# Patient Record
Sex: FEMALE | Race: BLACK OR AFRICAN AMERICAN | NOT HISPANIC OR LATINO | URBAN - METROPOLITAN AREA
[De-identification: names, ages, dates, MRNs, and addresses within clinical notes are randomized per-mention and may not be internally consistent; named-entity substitution may affect disease eponyms.]

---

## 2022-04-21 ENCOUNTER — INPATIENT (INPATIENT)
Facility: HOSPITAL | Age: 62
LOS: 2 days | Discharge: HOME HEALTH | End: 2022-04-24
Attending: INTERNAL MEDICINE | Admitting: INTERNAL MEDICINE
Payer: MEDICARE

## 2022-04-21 VITALS
WEIGHT: 164.91 LBS | DIASTOLIC BLOOD PRESSURE: 64 MMHG | RESPIRATION RATE: 18 BRPM | HEIGHT: 64 IN | HEART RATE: 138 BPM | SYSTOLIC BLOOD PRESSURE: 98 MMHG | TEMPERATURE: 100 F | OXYGEN SATURATION: 97 %

## 2022-04-21 LAB
ALBUMIN SERPL ELPH-MCNC: 2.3 G/DL — LOW (ref 3.3–5)
ALP SERPL-CCNC: 87 U/L — SIGNIFICANT CHANGE UP (ref 40–120)
ALT FLD-CCNC: 11 U/L — LOW (ref 12–78)
ANION GAP SERPL CALC-SCNC: 8 MMOL/L — SIGNIFICANT CHANGE UP (ref 5–17)
APTT BLD: 34 SEC — SIGNIFICANT CHANGE UP (ref 27.5–35.5)
AST SERPL-CCNC: 19 U/L — SIGNIFICANT CHANGE UP (ref 15–37)
BASOPHILS # BLD AUTO: 0.08 K/UL — SIGNIFICANT CHANGE UP (ref 0–0.2)
BASOPHILS NFR BLD AUTO: 0.5 % — SIGNIFICANT CHANGE UP (ref 0–2)
BILIRUB SERPL-MCNC: 0.2 MG/DL — SIGNIFICANT CHANGE UP (ref 0.2–1.2)
BUN SERPL-MCNC: 10 MG/DL — SIGNIFICANT CHANGE UP (ref 7–23)
CALCIUM SERPL-MCNC: 9.6 MG/DL — SIGNIFICANT CHANGE UP (ref 8.5–10.1)
CHLORIDE SERPL-SCNC: 101 MMOL/L — SIGNIFICANT CHANGE UP (ref 96–108)
CO2 SERPL-SCNC: 23 MMOL/L — SIGNIFICANT CHANGE UP (ref 22–31)
CREAT SERPL-MCNC: 0.66 MG/DL — SIGNIFICANT CHANGE UP (ref 0.5–1.3)
EGFR: 98 ML/MIN/1.73M2 — SIGNIFICANT CHANGE UP
EOSINOPHIL # BLD AUTO: 0.68 K/UL — HIGH (ref 0–0.5)
EOSINOPHIL NFR BLD AUTO: 4.5 % — SIGNIFICANT CHANGE UP (ref 0–6)
FLUAV AG NPH QL: SIGNIFICANT CHANGE UP
FLUBV AG NPH QL: SIGNIFICANT CHANGE UP
GLUCOSE SERPL-MCNC: 109 MG/DL — HIGH (ref 70–99)
HCT VFR BLD CALC: 28.7 % — LOW (ref 34.5–45)
HGB BLD-MCNC: 8.7 G/DL — LOW (ref 11.5–15.5)
IMM GRANULOCYTES NFR BLD AUTO: 0.5 % — SIGNIFICANT CHANGE UP (ref 0–1.5)
INR BLD: 1.49 RATIO — HIGH (ref 0.88–1.16)
LACTATE SERPL-SCNC: 1 MMOL/L — SIGNIFICANT CHANGE UP (ref 0.7–2)
LACTATE SERPL-SCNC: 2.1 MMOL/L — HIGH (ref 0.7–2)
LYMPHOCYTES # BLD AUTO: 1.08 K/UL — SIGNIFICANT CHANGE UP (ref 1–3.3)
LYMPHOCYTES # BLD AUTO: 7.2 % — LOW (ref 13–44)
MCHC RBC-ENTMCNC: 24.2 PG — LOW (ref 27–34)
MCHC RBC-ENTMCNC: 30.3 G/DL — LOW (ref 32–36)
MCV RBC AUTO: 79.7 FL — LOW (ref 80–100)
MONOCYTES # BLD AUTO: 1.17 K/UL — HIGH (ref 0–0.9)
MONOCYTES NFR BLD AUTO: 7.8 % — SIGNIFICANT CHANGE UP (ref 2–14)
NEUTROPHILS # BLD AUTO: 11.86 K/UL — HIGH (ref 1.8–7.4)
NEUTROPHILS NFR BLD AUTO: 79.5 % — HIGH (ref 43–77)
NRBC # BLD: 0 /100 WBCS — SIGNIFICANT CHANGE UP (ref 0–0)
PLATELET # BLD AUTO: 611 K/UL — HIGH (ref 150–400)
POTASSIUM SERPL-MCNC: 4 MMOL/L — SIGNIFICANT CHANGE UP (ref 3.5–5.3)
POTASSIUM SERPL-SCNC: 4 MMOL/L — SIGNIFICANT CHANGE UP (ref 3.5–5.3)
PROT SERPL-MCNC: 8.1 GM/DL — SIGNIFICANT CHANGE UP (ref 6–8.3)
PROTHROM AB SERPL-ACNC: 18 SEC — HIGH (ref 10.5–13.4)
RBC # BLD: 3.6 M/UL — LOW (ref 3.8–5.2)
RBC # FLD: 17.5 % — HIGH (ref 10.3–14.5)
SARS-COV-2 RNA SPEC QL NAA+PROBE: SIGNIFICANT CHANGE UP
SODIUM SERPL-SCNC: 132 MMOL/L — LOW (ref 135–145)
TROPONIN I, HIGH SENSITIVITY RESULT: <3 NG/L — SIGNIFICANT CHANGE UP
WBC # BLD: 14.95 K/UL — HIGH (ref 3.8–10.5)
WBC # FLD AUTO: 14.95 K/UL — HIGH (ref 3.8–10.5)

## 2022-04-21 PROCEDURE — 99284 EMERGENCY DEPT VISIT MOD MDM: CPT | Mod: FS,CS

## 2022-04-21 PROCEDURE — 99223 1ST HOSP IP/OBS HIGH 75: CPT

## 2022-04-21 PROCEDURE — 93010 ELECTROCARDIOGRAM REPORT: CPT

## 2022-04-21 PROCEDURE — 71045 X-RAY EXAM CHEST 1 VIEW: CPT | Mod: 26

## 2022-04-21 PROCEDURE — 71275 CT ANGIOGRAPHY CHEST: CPT | Mod: 26,MA

## 2022-04-21 RX ORDER — HEPARIN SODIUM 5000 [USP'U]/ML
5000 INJECTION INTRAVENOUS; SUBCUTANEOUS EVERY 12 HOURS
Refills: 0 | Status: DISCONTINUED | OUTPATIENT
Start: 2022-04-21 | End: 2022-04-24

## 2022-04-21 RX ORDER — LANOLIN ALCOHOL/MO/W.PET/CERES
3 CREAM (GRAM) TOPICAL ONCE
Refills: 0 | Status: DISCONTINUED | OUTPATIENT
Start: 2022-04-21 | End: 2022-04-24

## 2022-04-21 RX ORDER — SODIUM CHLORIDE 9 MG/ML
1700 INJECTION INTRAMUSCULAR; INTRAVENOUS; SUBCUTANEOUS ONCE
Refills: 0 | Status: COMPLETED | OUTPATIENT
Start: 2022-04-21 | End: 2022-04-21

## 2022-04-21 RX ORDER — ONDANSETRON 8 MG/1
4 TABLET, FILM COATED ORAL ONCE
Refills: 0 | Status: COMPLETED | OUTPATIENT
Start: 2022-04-21 | End: 2022-04-21

## 2022-04-21 RX ORDER — MORPHINE SULFATE 50 MG/1
2 CAPSULE, EXTENDED RELEASE ORAL EVERY 4 HOURS
Refills: 0 | Status: DISCONTINUED | OUTPATIENT
Start: 2022-04-21 | End: 2022-04-24

## 2022-04-21 RX ORDER — SODIUM CHLORIDE 9 MG/ML
3 INJECTION INTRAMUSCULAR; INTRAVENOUS; SUBCUTANEOUS ONCE
Refills: 0 | Status: DISCONTINUED | OUTPATIENT
Start: 2022-04-21 | End: 2022-04-21

## 2022-04-21 RX ORDER — MORPHINE SULFATE 50 MG/1
4 CAPSULE, EXTENDED RELEASE ORAL ONCE
Refills: 0 | Status: DISCONTINUED | OUTPATIENT
Start: 2022-04-21 | End: 2022-04-21

## 2022-04-21 RX ORDER — SODIUM CHLORIDE 9 MG/ML
1000 INJECTION, SOLUTION INTRAVENOUS
Refills: 0 | Status: DISCONTINUED | OUTPATIENT
Start: 2022-04-21 | End: 2022-04-22

## 2022-04-21 RX ORDER — ACETAMINOPHEN 500 MG
650 TABLET ORAL EVERY 6 HOURS
Refills: 0 | Status: DISCONTINUED | OUTPATIENT
Start: 2022-04-21 | End: 2022-04-24

## 2022-04-21 RX ORDER — MORPHINE SULFATE 50 MG/1
2 CAPSULE, EXTENDED RELEASE ORAL ONCE
Refills: 0 | Status: DISCONTINUED | OUTPATIENT
Start: 2022-04-21 | End: 2022-04-21

## 2022-04-21 RX ORDER — METOPROLOL TARTRATE 50 MG
25 TABLET ORAL
Refills: 0 | Status: DISCONTINUED | OUTPATIENT
Start: 2022-04-21 | End: 2022-04-24

## 2022-04-21 RX ADMIN — SODIUM CHLORIDE 125 MILLILITER(S): 9 INJECTION, SOLUTION INTRAVENOUS at 21:24

## 2022-04-21 RX ADMIN — MORPHINE SULFATE 4 MILLIGRAM(S): 50 CAPSULE, EXTENDED RELEASE ORAL at 16:17

## 2022-04-21 RX ADMIN — Medication 25 MILLIGRAM(S): at 20:31

## 2022-04-21 RX ADMIN — HEPARIN SODIUM 5000 UNIT(S): 5000 INJECTION INTRAVENOUS; SUBCUTANEOUS at 22:36

## 2022-04-21 RX ADMIN — MORPHINE SULFATE 4 MILLIGRAM(S): 50 CAPSULE, EXTENDED RELEASE ORAL at 15:47

## 2022-04-21 RX ADMIN — Medication 650 MILLIGRAM(S): at 21:56

## 2022-04-21 RX ADMIN — MORPHINE SULFATE 2 MILLIGRAM(S): 50 CAPSULE, EXTENDED RELEASE ORAL at 18:19

## 2022-04-21 RX ADMIN — SODIUM CHLORIDE 1700 MILLILITER(S): 9 INJECTION INTRAMUSCULAR; INTRAVENOUS; SUBCUTANEOUS at 15:47

## 2022-04-21 RX ADMIN — ONDANSETRON 4 MILLIGRAM(S): 8 TABLET, FILM COATED ORAL at 17:43

## 2022-04-21 NOTE — ED PROVIDER NOTE - OBJECTIVE STATEMENT
63 yo F hx of stage iv Lung CA (on chemotherapy)  c/o chest pain and SOB she went to urgent care where she was transferred to the ED. admits to chronic cough. no remedy tried . vaccinated  for COVID.

## 2022-04-21 NOTE — H&P ADULT - HISTORY OF PRESENT ILLNESS
61 yo female PMH stage IV Lung CA (not on chemotherapy)  for past 6 months diagnosed with lung cancer 2 years ago now presents with chest pain and SOB. She went to urgent care where she was transferred to the ED. Admits to chronic cough, no effective remedy tried. Has been vaccinated  for COVID.  . 63 yo female PMH stage IV Lung CA (not on chemotherapy)  for past 6 months diagnosed with lung cancer 2 years ago now presents with chest pain and SOB. She went to urgent care where she was transferred to the ED. Admits to chronic cough, no effective remedy tried. Has been vaccinated  for COVID.  CTA shows large lung mass with likely extension. Has mediport in place.

## 2022-04-21 NOTE — ED PROVIDER NOTE - CLINICAL SUMMARY MEDICAL DECISION MAKING FREE TEXT BOX
61 yo F hx of stage iv Lung CA (on chemotherapy)  c/o chest pain and SOB she went to urgent care where she was transferred to the ED. admits to chronic cough. no remedy tried . vaccinated  for COVID.    exam as above  Tachy   r/o infectious/ pE /ACS etiology  labs    CT   abx  dw attending    likely admission

## 2022-04-21 NOTE — H&P ADULT - NSHPPHYSICALEXAM_GEN_ALL_CORE
PHYSICAL EXAMINATION:  Vital Signs Last 24 Hrs  T(C): 37.6 (21 Apr 2022 18:07), Max: 37.6 (21 Apr 2022 18:07)  T(F): 99.6 (21 Apr 2022 18:07), Max: 99.6 (21 Apr 2022 18:07)  HR: 132 (21 Apr 2022 19:50) (132 - 139)  BP: 110/62 (21 Apr 2022 19:50) (98/64 - 110/62)  BP(mean): --  RR: 19 (21 Apr 2022 19:50) (18 - 19)  SpO2: 98% (21 Apr 2022 19:50) (97% - 98%)  CAPILLARY BLOOD GLUCOSE          GENERAL: NAD, well-groomed, well-developed  HEAD:  atraumatic, normocephalic  EYES: sclera anicteric  ENMT: mucous membranes moist  NECK: supple, No JVD  CHEST/LUNG: clear to auscultation bilaterally; no rales, rhonchi, or wheezing b/l  HEART: normal S1, S2  ABDOMEN: BS+, soft, ND, NT   EXTREMITIES:  pulses palpable; no clubbing, cyanosis, or edema b/l LEs  NEURO: awake, alert, interactive; moves all extremities  SKIN: no rashes or lesions PHYSICAL EXAMINATION:  Vital Signs Last 24 Hrs  T(C): 37.6 (21 Apr 2022 18:07), Max: 37.6 (21 Apr 2022 18:07)  T(F): 99.6 (21 Apr 2022 18:07), Max: 99.6 (21 Apr 2022 18:07)  HR: 132 (21 Apr 2022 19:50) (132 - 139)  BP: 110/62 (21 Apr 2022 19:50) (98/64 - 110/62)  BP(mean): --  RR: 19 (21 Apr 2022 19:50) (18 - 19)  SpO2: 98% (21 Apr 2022 19:50) (97% - 98%)  CAPILLARY BLOOD GLUCOSE          GENERAL: seen in ER, some right sided rib pain, no fevers, no cough   ENMT: mucous membranes moist  NECK: supple, No JVD  CHEST/LUNG: clear to auscultation bilaterally; no rales, rhonchi, or wheezing b/l  HEART: normal S1, S2  ABDOMEN: BS+, soft, ND, NT   EXTREMITIES:  pulses palpable; no clubbing, cyanosis, or edema b/l LEs  NEURO: awake, alert, interactive; moves all extremities  SKIN: no rashes or lesions

## 2022-04-21 NOTE — H&P ADULT - NSHPLABSRESULTS_GEN_ALL_CORE
LABS:                        8.7    14.95 )-----------( 611      ( 21 Apr 2022 15:52 )             28.7     04-21    132<L>  |  101  |  10  ----------------------------<  109<H>  4.0   |  23  |  0.66    Ca    9.6      21 Apr 2022 16:38    TPro  8.1  /  Alb  2.3<L>  /  TBili  0.2  /  DBili  x   /  AST  19  /  ALT  11<L>  /  AlkPhos  87  04-21    PT/INR - ( 21 Apr 2022 16:38 )   PT: 18.0 sec;   INR: 1.49 ratio         PTT - ( 21 Apr 2022 16:38 )  PTT:34.0 sec        RADIOLOGY & ADDITIONAL TESTS:

## 2022-04-21 NOTE — ED ADULT TRIAGE NOTE - CHIEF COMPLAINT QUOTE
pt sent to ED by urgent care for dyspnea, dry cough x 3 days, hr 136 in office, pt c/o chest pain and sob. hx: lung CA

## 2022-04-21 NOTE — ED ADULT NURSE NOTE - OBJECTIVE STATEMENT
Pt presents to the ED complaining of headache/ dyspnea. Pt states she was having chest pain/sob x3days, went to  today and was sent to ED. Pt tachypneic/tachy in triage. Pt complaining of chills/dry cough, fever hx lung cx. Denies n/v/d, abd, constipation. Denies sick contact

## 2022-04-21 NOTE — H&P ADULT - ASSESSMENT
63 yo female PMH stage IV Lung CA (not on chemotherapy)  for past 6 months diagnosed with lung cancer 2 years ago now presents with chest pain and SOB. She went to urgent care where she was transferred to the ED. Admits to chronic cough, no effective remedy tried. Has been vaccinated  for COVID.  CTA shows large lung mass with likely extension.     Plan: Admit to medicine for stage IV lung cancer, possible pneumonia or progression of lung cancer. Will start empiric ABX as WBC count elevated. Lactate high on arrival   at 2.1 then 1.0 with IVF. Will continue IVF LR, add Lopressor 25 mg bid for HR and BP control. Added morphine sulphate prn pain.     Discussed GOC, not willing to sign DNR, will consider. Discussed plan with son at bedside.    61 yo female PMH stage IV Lung CA (not on chemotherapy)  for past 6 months diagnosed with lung cancer 2 years ago now presents with chest pain and SOB. She went to urgent care where she was transferred to the ED. Admits to chronic cough, no effective remedy tried. Has been vaccinated  for COVID.  CTA shows large lung mass with likely extension.     Plan: Admit to medicine for stage IV lung cancer, possible pneumonia or progression of lung cancer. Will start empiric ABX as WBC count elevated. Lactate high on arrival   at 2.1 then 1.0 with IVF. Will continue IVF LR, add Lopressor 25 mg bid for HR and BP control, took other BP med at home. Added morphine sulphate prn pain.     Discussed GOC, not willing to sign DNR at present, will consider. Discussed plan with son at bedside.    63 yo female PMH stage IV Lung CA (not on chemotherapy)  for past 6 months diagnosed with lung cancer 2 years ago now presents with chest pain and SOB. She went to urgent care where she was transferred to the ED. Admits to chronic cough, no effective remedy tried. Has been vaccinated  for COVID.  CTA shows large lung mass with likely extension.     Plan: Admit to medicine for stage IV lung cancer, possible pneumonia, more likely progression of lung cancer. Will start empiric ABX as WBC count elevated. Lactate high on arrival at 2.1 then 1.0 with IVF. Will continue IVF LR, add Lopressor 25 mg bid for HR and BP control, took other BP med at home. Added morphine sulphate prn pain.   Started IV Levaquin as has PCN allergy. RA sat 96 % on arrival.      Discussed GOC, not willing to sign DNR at present, will consider. Discussed plan with son at bedside.    61 yo female PMH stage IV Lung CA (not on chemotherapy)  for past 6 months diagnosed with lung cancer 2 years ago now presents with chest pain and SOB. She went to urgent care where she was transferred to the ED. Admits to chronic cough, no effective remedy tried. Has been vaccinated  for COVID.  CTA shows large lung mass with likely extension.     Plan: Admit to medicine for stage IV lung cancer, possible pneumonia, more likely progression of lung cancer. Will start empiric ABX as WBC count elevated. Lactate high on arrival at 2.1 then 1.0 with IVF. Will continue IVF LR, add Lopressor 25 mg bid for HR and BP control, took other BP med at home. Added morphine sulphate prn pain. Started IV Levaquin as has PCN allergy. RA sat 96 % on arrival.  Suspect all symptoms are from advanced lung cancer with growth into mediastinum.      Discussed GOC, not willing to sign DNR at present, will consider. Discussed plan with son at bedside.

## 2022-04-21 NOTE — ED PROVIDER NOTE - NS ED ATTENDING STATEMENT MOD
This was a shared visit with the SOY. I reviewed and verified the documentation and independently performed the documented:

## 2022-04-22 DIAGNOSIS — Z51.5 ENCOUNTER FOR PALLIATIVE CARE: ICD-10-CM

## 2022-04-22 DIAGNOSIS — R53.1 WEAKNESS: ICD-10-CM

## 2022-04-22 DIAGNOSIS — R10.9 UNSPECIFIED ABDOMINAL PAIN: ICD-10-CM

## 2022-04-22 DIAGNOSIS — R05.9 COUGH, UNSPECIFIED: ICD-10-CM

## 2022-04-22 DIAGNOSIS — R06.02 SHORTNESS OF BREATH: ICD-10-CM

## 2022-04-22 DIAGNOSIS — R53.83 OTHER FATIGUE: ICD-10-CM

## 2022-04-22 DIAGNOSIS — C34.90 MALIGNANT NEOPLASM OF UNSPECIFIED PART OF UNSPECIFIED BRONCHUS OR LUNG: ICD-10-CM

## 2022-04-22 LAB
ANION GAP SERPL CALC-SCNC: 9 MMOL/L — SIGNIFICANT CHANGE UP (ref 5–17)
APPEARANCE UR: CLEAR — SIGNIFICANT CHANGE UP
BILIRUB UR-MCNC: NEGATIVE — SIGNIFICANT CHANGE UP
BUN SERPL-MCNC: 5 MG/DL — LOW (ref 7–23)
CALCIUM SERPL-MCNC: 10.1 MG/DL — SIGNIFICANT CHANGE UP (ref 8.5–10.1)
CHLORIDE SERPL-SCNC: 102 MMOL/L — SIGNIFICANT CHANGE UP (ref 96–108)
CO2 SERPL-SCNC: 23 MMOL/L — SIGNIFICANT CHANGE UP (ref 22–31)
COLOR SPEC: YELLOW — SIGNIFICANT CHANGE UP
CREAT SERPL-MCNC: 0.56 MG/DL — SIGNIFICANT CHANGE UP (ref 0.5–1.3)
DIFF PNL FLD: NEGATIVE — SIGNIFICANT CHANGE UP
EGFR: 102 ML/MIN/1.73M2 — SIGNIFICANT CHANGE UP
GLUCOSE SERPL-MCNC: 87 MG/DL — SIGNIFICANT CHANGE UP (ref 70–99)
GLUCOSE UR QL: NEGATIVE MG/DL — SIGNIFICANT CHANGE UP
HCT VFR BLD CALC: 24.3 % — LOW (ref 34.5–45)
HCV AB S/CO SERPL IA: 0.13 S/CO — SIGNIFICANT CHANGE UP (ref 0–0.99)
HCV AB SERPL-IMP: SIGNIFICANT CHANGE UP
HGB BLD-MCNC: 7.3 G/DL — LOW (ref 11.5–15.5)
KETONES UR-MCNC: NEGATIVE — SIGNIFICANT CHANGE UP
LEUKOCYTE ESTERASE UR-ACNC: NEGATIVE — SIGNIFICANT CHANGE UP
MCHC RBC-ENTMCNC: 23.9 PG — LOW (ref 27–34)
MCHC RBC-ENTMCNC: 30 G/DL — LOW (ref 32–36)
MCV RBC AUTO: 79.4 FL — LOW (ref 80–100)
NITRITE UR-MCNC: NEGATIVE — SIGNIFICANT CHANGE UP
NRBC # BLD: 0 /100 WBCS — SIGNIFICANT CHANGE UP (ref 0–0)
PH UR: 7 — SIGNIFICANT CHANGE UP (ref 5–8)
PLATELET # BLD AUTO: 510 K/UL — HIGH (ref 150–400)
POTASSIUM SERPL-MCNC: 4.2 MMOL/L — SIGNIFICANT CHANGE UP (ref 3.5–5.3)
POTASSIUM SERPL-SCNC: 4.2 MMOL/L — SIGNIFICANT CHANGE UP (ref 3.5–5.3)
PROCALCITONIN SERPL-MCNC: 0.15 NG/ML — HIGH (ref 0.02–0.1)
PROT UR-MCNC: NEGATIVE MG/DL — SIGNIFICANT CHANGE UP
RBC # BLD: 3.06 M/UL — LOW (ref 3.8–5.2)
RBC # FLD: 17.2 % — HIGH (ref 10.3–14.5)
SODIUM SERPL-SCNC: 134 MMOL/L — LOW (ref 135–145)
SP GR SPEC: 1 — LOW (ref 1.01–1.02)
UROBILINOGEN FLD QL: NEGATIVE MG/DL — SIGNIFICANT CHANGE UP
WBC # BLD: 13.7 K/UL — HIGH (ref 3.8–10.5)
WBC # FLD AUTO: 13.7 K/UL — HIGH (ref 3.8–10.5)

## 2022-04-22 PROCEDURE — 99232 SBSQ HOSP IP/OBS MODERATE 35: CPT

## 2022-04-22 PROCEDURE — 99223 1ST HOSP IP/OBS HIGH 75: CPT

## 2022-04-22 RX ADMIN — MORPHINE SULFATE 2 MILLIGRAM(S): 50 CAPSULE, EXTENDED RELEASE ORAL at 05:43

## 2022-04-22 RX ADMIN — Medication 25 MILLIGRAM(S): at 17:18

## 2022-04-22 RX ADMIN — HEPARIN SODIUM 5000 UNIT(S): 5000 INJECTION INTRAVENOUS; SUBCUTANEOUS at 05:13

## 2022-04-22 RX ADMIN — Medication 650 MILLIGRAM(S): at 10:20

## 2022-04-22 RX ADMIN — HEPARIN SODIUM 5000 UNIT(S): 5000 INJECTION INTRAVENOUS; SUBCUTANEOUS at 17:18

## 2022-04-22 RX ADMIN — MORPHINE SULFATE 2 MILLIGRAM(S): 50 CAPSULE, EXTENDED RELEASE ORAL at 21:17

## 2022-04-22 RX ADMIN — Medication 650 MILLIGRAM(S): at 00:05

## 2022-04-22 RX ADMIN — Medication 650 MILLIGRAM(S): at 09:29

## 2022-04-22 RX ADMIN — MORPHINE SULFATE 2 MILLIGRAM(S): 50 CAPSULE, EXTENDED RELEASE ORAL at 05:13

## 2022-04-22 RX ADMIN — MORPHINE SULFATE 2 MILLIGRAM(S): 50 CAPSULE, EXTENDED RELEASE ORAL at 22:03

## 2022-04-22 RX ADMIN — SODIUM CHLORIDE 125 MILLILITER(S): 9 INJECTION, SOLUTION INTRAVENOUS at 07:56

## 2022-04-22 NOTE — CONSULT NOTE ADULT - PROBLEM SELECTOR RECOMMENDATION 5
could be cancer related fatigue  recommend optimizing pt's sleep-wake cycle with a schedule  pt takes Nyquil at home "every night"  recommend to continue melatonin while in hospital for sleep

## 2022-04-22 NOTE — CONSULT NOTE ADULT - ASSESSMENT
61 yo female PMH stage IV Lung CA (not on chemotherapy)  for past 6 months diagnosed with lung cancer 2 years ago now presents with chest pain and SOB. She went to urgent care where she was transferred to the ED. CTA shows large lung mass with likely extension. Palliative care consulted for GOC discussion in the setting of stage IV lung cancer. 63 yo female PMH stage IV Lung CA (not on chemotherapy)  for past 6 months diagnosed with lung cancer 2 years ago now presents with chest pain and SOB. She went to urgent care where she was transferred to the ED. CTA shows large lung mass with likely extension. Palliative care consulted for GOC discussion and symptom management in the setting of stage IV lung cancer.

## 2022-04-22 NOTE — CONSULT NOTE ADULT - SUBJECTIVE AND OBJECTIVE BOX
HPI:  61 yo female PMH stage IV Lung CA (not on chemotherapy)  for past 6 months diagnosed with lung cancer 2 years ago now presents with chest pain and SOB. She went to urgent care where she was transferred to the ED. Admits to chronic cough, no effective remedy tried. Has been vaccinated  for COVID.  CTA shows large lung mass with likely extension. Has mediport in place.  (2022 19:57)    PERTINENT PM/SXH:     FAMILY HISTORY:    ITEMS NOT CHECKED ARE NOT PRESENT    SOCIAL HISTORY:   Significant other/partner:  [x ]  Children:  [ ]  Denominational/Spirituality:  Substance hx:  [ ]   Tobacco hx:  [ ]   Alcohol hx: [ ]   Home Opioid hx:  [ ] I-Stop Reference No:[id number]  Living Situation: [ x]Home  [ ]Long term care  [ ]Rehab [ ]Other  with nat Alexander    ADVANCE DIRECTIVES:    DNR  MOLST  [ ]  Living Will  [ ]   DECISION MAKER(s):  [ ] Health Care Proxy(s)  [ ] Surrogate(s)  [ ] Guardian           Name(s): Phone Number(s):  nat Alexander: [phone]    BASELINE (I)ADL(s) (prior to admission):  [place]: [ ]Total  [x ] Moderate [ ]Dependent    Allergies    penicillin (Rash)    Intolerances    MEDICATIONS  (STANDING):  heparin   Injectable 5000 Unit(s) SubCutaneous every 12 hours  levoFLOXacin IVPB      levoFLOXacin IVPB 500 milliGRAM(s) IV Intermittent every 24 hours  metoprolol tartrate 25 milliGRAM(s) Oral two times a day    MEDICATIONS  (PRN):  acetaminophen     Tablet .. 650 milliGRAM(s) Oral every 6 hours PRN Temp greater or equal to 38C (100.4F), Mild Pain (1 - 3)  melatonin 3 milliGRAM(s) Oral once PRN Sleep  morphine  - Injectable 2 milliGRAM(s) IV Push every 4 hours PRN Mild Pain (1 - 3)    PRESENT SYMPTOMS: [ ]Unable to obtain due to poor mentation   Source if other than patient:  [ ]Family   [ ]Team     Pain (Impact on QOL):  denies  Location -         Minimal acceptable level (0-10 scale):                    Aggravating factors -  Quality -  Radiation -  Severity (0-10 scale) -    Timing -    PAIN AD Score:     [email or web address] (press ctrl +  left click to view)    Dyspnea:                           [ x]Mild [ ]Moderate [ ]Severe  Anxiety:                             [ ]Mild [ ]Moderate [ ]Severe  Fatigue:                             [ ]Mild [x ]Moderate [ ]Severe  Nausea:                             [ ]Mild [ ]Moderate [ ]Severe  Loss of appetite:              [ ]Mild [ ]Moderate [ ]Severe  Constipation:                    [ ]Mild [ ]Moderate [ ]Severe  Grief Present                    [ ] Yes   [x] No   Other Symptoms:  [x ]All other review of systems negative     Karnofsky Performance Score/Palliative Performance Status Version 2:    60-70     %    http://palliative.info/resource_material/[email or web address].pdf  PHYSICAL EXAM:  Vital Signs Last 24 Hrs  T(C): 36.8 (2022 10:24), Max: 38.1 (2022 21:16)  T(F): 98.2 (2022 10:24), Max: 100.5 (2022 21:16)  HR: 117 (2022 10:24) (109 - 139)  BP: 102/67 (2022 10:24) (95/61 - 114/72)  BP(mean): --  RR: 19 (2022 10:24) (18 - 20)  SpO2: 100% (2022 10:24) (97% - 100%) I&O's Summary    2022 07:  -  2022 07:00  --------------------------------------------------------  IN: 1250 mL / OUT: 0 mL / NET: 1250 mL    2022 07:01  -  2022 12:33  --------------------------------------------------------  IN: 120 mL / OUT: 0 mL / NET: 120 mL    GENERAL:  [x ]Alert  [x ]Oriented x3   [ ]Lethargic  [ ]Cachexia  [ ]Unarousable  [x ]Verbal  [ ]Non-Verbal  Behavioral:   [ ] Anxiety  [ ] Delirium [ ] Agitation [ ] Other  HEENT:  [x ]Normal   [ ]Dry mouth   [ ]ET Tube/Trach  [ ]Oral lesions  PULMONARY:   [x]Clear [ ]Tachypnea  [ ]Audible excessive secretions   [ ]Rhonchi        [ ]Right [ ]Left [ ]Bilateral  [ ]Crackles        [ ]Right [ ]Left [ ]Bilateral  [ ]Wheezing     [ ]Right [ ]Left [ ]Bilateral  CARDIOVASCULAR:    [ ]Regular [ ]Irregular [x ]Tachy  [ ]Boone [ ]Murmur [ ]Other  GASTROINTESTINAL:  [ x]Soft  [ ]Distended   [x ]+BS  [ x]Non tender [ ]Tender  [ ]PEG [ ]OGT/ NGT  Last BM: GENITOURINARY:   [x ]Normal [ ] Incontinent   [ ]Oliguria/Anuria   [ ]Mei  MUSCULOSKELETAL:   [ ]Normal   [x ]Weakness  [ ]Bed/Wheelchair bound [ ]Edema  NEUROLOGIC:   [ x]No focal deficits  [ ] Cognitive impairment  [ ] Dysphagia [ ]Dysarthria [ ] Paresis [ ]Other   SKIN:   [x ]Normal   [ ]Pressure ulcer(s)  [ ]Rash    CRITICAL CARE:  [ ] Shock Present  [ ]Septic [ ]Cardiogenic [ ]Neurologic [ ]Hypovolemic  [ ]  Vasopressors [ ]  Inotropes   [ ] Respiratory failure present  [ ] Acute  [ ] Chronic [ ] Hypoxic  [ ] Hypercarbic [ ] Other  [ ] Other organ failure     LABS:                        7.3    13.70 )-----------( 510      ( 2022 07:02 )             24.3   04-22    134<L>  |  102  |  5<L>  ----------------------------<  87  4.2   |  23  |  0.56    Ca    10.1      2022 07:02    TPro  8.1  /  Alb  2.3<L>  /  TBili  0.2  /  DBili  x   /  AST  19  /  ALT  11<L>  /  AlkPhos  87  04-21  PT/INR - ( 2022 16:38 )   PT: 18.0 sec;   INR: 1.49 ratio         PTT - ( 2022 16:38 )  PTT:34.0 sec    Urinalysis Basic - ( 2022 09:44 )    Color: Yellow / Appearance: Clear / S.005 / pH: x  Gluc: x / Ketone: Negative  / Bili: Negative / Urobili: Negative mg/dL   Blood: x / Protein: Negative mg/dL / Nitrite: Negative   Leuk Esterase: Negative / RBC: x / WBC x   Sq Epi: x / Non Sq Epi: x / Bacteria: x      RADIOLOGY & ADDITIONAL STUDIES:  < from: CT Angio Chest PE Protocol w/ IV Cont (22 @ 17:35) >    IMPRESSION:    No main, lobar or segmental pulmonary embolus. The subsegmental branches   of the pulmonary arteries are not evaluated secondary to motion.    The right lower lobe has been replaced by tumor   infiltration/consolidation. There is infiltration of the mass into the   right hilum and subcarinal region of the mediastinum.    < end of copied text >    < from: Xray Chest 1 View- PORTABLE-Urgent (22 @ 15:57) >    Impression:  There is a large right-sided mass measuring 14 x 8cm which may be better   evaluated with CT or compared to prior studies not presently available.  No specific evidence of pneumonia.  Infusion catheter in the right atrium without pneumothorax.    < end of copied text >      PROTEIN CALORIE MALNUTRITION PRESENT: [ ] Yes [ x] No  [ ] PPSV2 < or = to 30% [ ] significant weight loss  [ ] poor nutritional intake [ ] catabolic state [ ] anasarca     Artificial Nutrition [ ]     REFERRALS:   [ ]Chaplaincy  [ ] Hospice  [ ]Child Life  [ ]Social Work  [ ]Case management [ ]Holistic Therapy   Goals of Care Discussion Document:  HPI:  63 yo female PMH stage IV Lung CA (not on chemotherapy)  for past 6 months diagnosed with lung cancer 2 years ago now presents with chest pain and SOB. She went to urgent care where she was transferred to the ED. Admits to chronic cough, no effective remedy tried. Has been vaccinated  for COVID.  CTA shows large lung mass with likely extension. Has mediport in place.  (2022 19:57)    PERTINENT PM/SXH:     FAMILY HISTORY:    ITEMS NOT CHECKED ARE NOT PRESENT    SOCIAL HISTORY:   Significant other/partner:  [x ]  Children:  [ ]  Buddhism/Spirituality:  Substance hx:  [ ]   Tobacco hx:  [ ]   Alcohol hx: [ ]   Home Opioid hx:  [ ] I-Stop Reference No:[id number]  Living Situation: [ x]Home  [ ]Long term care  [ ]Rehab [ ]Other  with nat Alexander    ADVANCE DIRECTIVES:    DNR  MOLST  [ ]  Living Will  [ ]   DECISION MAKER(s):  [ ] Health Care Proxy(s)  [ ] Surrogate(s)  [ ] Guardian           Name(s): Phone Number(s):  nat Alexander: [phone]    BASELINE (I)ADL(s) (prior to admission):  [place]: [ ]Total  [x ] Moderate [ ]Dependent    Allergies    penicillin (Rash)    Intolerances    MEDICATIONS  (STANDING):  heparin   Injectable 5000 Unit(s) SubCutaneous every 12 hours  levoFLOXacin IVPB      levoFLOXacin IVPB 500 milliGRAM(s) IV Intermittent every 24 hours  metoprolol tartrate 25 milliGRAM(s) Oral two times a day    MEDICATIONS  (PRN):  acetaminophen     Tablet .. 650 milliGRAM(s) Oral every 6 hours PRN Temp greater or equal to 38C (100.4F), Mild Pain (1 - 3)  melatonin 3 milliGRAM(s) Oral once PRN Sleep  morphine  - Injectable 2 milliGRAM(s) IV Push every 4 hours PRN Mild Pain (1 - 3)    PRESENT SYMPTOMS: [ ]Unable to obtain due to poor mentation   Source if other than patient:  [ ]Family   [ ]Team     Pain (Impact on QOL):  denies  Location -         Minimal acceptable level (0-10 scale):                    Aggravating factors -  Quality -  Radiation -  Severity (0-10 scale) -    Timing -    PAIN AD Score:     [email or web address] (press ctrl +  left click to view)    Dyspnea:                           [ x]Mild [ ]Moderate [ ]Severe  Anxiety:                             [ ]Mild [ ]Moderate [ ]Severe  Fatigue:                             [ ]Mild [x ]Moderate [ ]Severe  Nausea:                             [ ]Mild [ ]Moderate [ ]Severe  Loss of appetite:              [ ]Mild [ ]Moderate [ ]Severe  Constipation:                    [ ]Mild [ ]Moderate [ ]Severe  Grief Present                    [ ] Yes   [x] No   Other Symptoms:  [x ]All other review of systems negative     Karnofsky Performance Score/Palliative Performance Status Version 2:    60-70     %    http://palliative.info/resource_material/PPSv2.pdf  PHYSICAL EXAM:  Vital Signs Last 24 Hrs  T(C): 36.8 (2022 10:24), Max: 38.1 (2022 21:16)  T(F): 98.2 (2022 10:24), Max: 100.5 (2022 21:16)  HR: 117 (2022 10:24) (109 - 139)  BP: 102/67 (2022 10:24) (95/61 - 114/72)  BP(mean): --  RR: 19 (2022 10:24) (18 - 20)  SpO2: 100% (2022 10:24) (97% - 100%) I&O's Summary    2022 07:  -  2022 07:00  --------------------------------------------------------  IN: 1250 mL / OUT: 0 mL / NET: 1250 mL    2022 07:01  -  2022 12:33  --------------------------------------------------------  IN: 120 mL / OUT: 0 mL / NET: 120 mL    GENERAL:  [x ]Alert  [x ]Oriented x3   [ ]Lethargic  [ ]Cachexia  [ ]Unarousable  [x ]Verbal  [ ]Non-Verbal  Behavioral:   [ ] Anxiety  [ ] Delirium [ ] Agitation [ ] Other  HEENT:  [x ]Normal   [ ]Dry mouth   [ ]ET Tube/Trach  [ ]Oral lesions  PULMONARY:   [x]Clear [ ]Tachypnea  [ ]Audible excessive secretions   [ ]Rhonchi        [ ]Right [ ]Left [ ]Bilateral  [ ]Crackles        [ ]Right [ ]Left [ ]Bilateral  [ ]Wheezing     [ ]Right [ ]Left [ ]Bilateral  CARDIOVASCULAR:    [ ]Regular [ ]Irregular [x ]Tachy  [ ]Boone [ ]Murmur [ ]Other  GASTROINTESTINAL:  [ x]Soft  [ ]Distended   [x ]+BS  [ x]Non tender [ ]Tender  [ ]PEG [ ]OGT/ NGT  Last BM:   GENITOURINARY:   [x ]Normal [ ] Incontinent   [ ]Oliguria/Anuria   [ ]Mei  MUSCULOSKELETAL:   [ ]Normal   [x ]Weakness  [ ]Bed/Wheelchair bound [ ]Edema  NEUROLOGIC:   [ x]No focal deficits  [ ] Cognitive impairment  [ ] Dysphagia [ ]Dysarthria [ ] Paresis [ ]Other   SKIN:   [x ]Normal   [ ]Pressure ulcer(s)  [ ]Rash    CRITICAL CARE:  [ ] Shock Present  [ ]Septic [ ]Cardiogenic [ ]Neurologic [ ]Hypovolemic  [ ]  Vasopressors [ ]  Inotropes   [ ] Respiratory failure present  [ ] Acute  [ ] Chronic [ ] Hypoxic  [ ] Hypercarbic [ ] Other  [ ] Other organ failure     LABS:                        7.3    13.70 )-----------( 510      ( 2022 07:02 )             24.3   04-22    134<L>  |  102  |  5<L>  ----------------------------<  87  4.2   |  23  |  0.56    Ca    10.1      2022 07:02    TPro  8.1  /  Alb  2.3<L>  /  TBili  0.2  /  DBili  x   /  AST  19  /  ALT  11<L>  /  AlkPhos  87  04-21  PT/INR - ( 2022 16:38 )   PT: 18.0 sec;   INR: 1.49 ratio         PTT - ( 2022 16:38 )  PTT:34.0 sec    Urinalysis Basic - ( 2022 09:44 )    Color: Yellow / Appearance: Clear / S.005 / pH: x  Gluc: x / Ketone: Negative  / Bili: Negative / Urobili: Negative mg/dL   Blood: x / Protein: Negative mg/dL / Nitrite: Negative   Leuk Esterase: Negative / RBC: x / WBC x   Sq Epi: x / Non Sq Epi: x / Bacteria: x      RADIOLOGY & ADDITIONAL STUDIES:  < from: CT Angio Chest PE Protocol w/ IV Cont (22 @ 17:35) >    IMPRESSION:    No main, lobar or segmental pulmonary embolus. The subsegmental branches   of the pulmonary arteries are not evaluated secondary to motion.    The right lower lobe has been replaced by tumor   infiltration/consolidation. There is infiltration of the mass into the   right hilum and subcarinal region of the mediastinum.    < end of copied text >    < from: Xray Chest 1 View- PORTABLE-Urgent (22 @ 15:57) >    Impression:  There is a large right-sided mass measuring 14 x 8cm which may be better   evaluated with CT or compared to prior studies not presently available.  No specific evidence of pneumonia.  Infusion catheter in the right atrium without pneumothorax.    < end of copied text >      PROTEIN CALORIE MALNUTRITION PRESENT: [ ] Yes [ x] No  [ ] PPSV2 < or = to 30% [ ] significant weight loss  [ ] poor nutritional intake [ ] catabolic state [ ] anasarca     Artificial Nutrition [ ]     REFERRALS:   [ ]Chaplaincy  [ ] Hospice  [ ]Child Life  [ ]Social Work  [ ]Case management [ ]Holistic Therapy   Goals of Care Discussion Document:

## 2022-04-22 NOTE — CONSULT NOTE ADULT - PROBLEM SELECTOR RECOMMENDATION 4
pt reports she used to walk the track with her son but has not recently d/t fatigue and wants to start back up  recommend PT edwar  pt in chair upon exam

## 2022-04-22 NOTE — CONSULT NOTE ADULT - PROBLEM SELECTOR RECOMMENDATION 6
pt reports she see's oncologist Dr. Rick Webb at [facility]- but has been on a break from chemo for 2 years but reported she needs to call him to see what options are available to her now. As per the pt's son at bedside Benjamin - the pt has been on and off of chemo - with frequent breaks but has been on a break now for about 2 years.  pt would be interested in learning of other options that can be given other then strictly just chemotherapy (ie immunotherapy)  recommend heme/onc input pt reports she see's oncologist Dr. Rick Webb at [facility]- but has been on a break from chemo for 2 years but reported she needs to call him to see what options are available to her now. As per the pt's son at bedside, Benjamin, - the pt has been on and off of chemo - with frequent breaks but has been on a break now for about 2 years.  pt would be interested in learning of other alternative options that can be given other than chemotherapy (ie immunotherapy)  recommend heme/onc input

## 2022-04-22 NOTE — CONSULT NOTE ADULT - PROBLEM SELECTOR RECOMMENDATION 3
pt admitted with right sided rib pain  reports no pain upon exam  pt received a total of 1 dose of morphine 2 mg IVP in the past 24 hours  recommend prn morphine as needed for pain

## 2022-04-22 NOTE — CONSULT NOTE ADULT - SUBJECTIVE AND OBJECTIVE BOX
Patient is a 62y old  Female who presents with a chief complaint of Right sided rib pain. (2022 12:44)    HPI:  61 yo female with HTN, Colon cancer S/P Surgery+ Colostomy followed by Chemo+ Radiation,  stage IV Lung CA diagnosed about two years ago  (not on chemotherapy)  for past 6 months . Non smoker.  Presented  with right sided chest pain, SOB, productive cough, Chills and subjective fever for few days.   Seen at  urgent care where she was transferred to the ED. Admits to chronic cough, no effective remedy tried.   Has been vaccinated  for COVID.  CTA shows large lung mass with likely extension. Has mediport in place.  (2022 19:57)    PAST MEDICAL & SURGICAL HISTORY:  as above    SOCIAL HISTORY: non smoker    Allergies  penicillin (Rash)    MEDICATIONS  (STANDING):  heparin   Injectable 5000 Unit(s) SubCutaneous every 12 hours  levoFLOXacin IVPB      levoFLOXacin IVPB 500 milliGRAM(s) IV Intermittent every 24 hours  metoprolol tartrate 25 milliGRAM(s) Oral two times a day    MEDICATIONS  (PRN):  acetaminophen     Tablet .. 650 milliGRAM(s) Oral every 6 hours PRN Temp greater or equal to 38C (100.4F), Mild Pain (1 - 3)  melatonin 3 milliGRAM(s) Oral once PRN Sleep  morphine  - Injectable 2 milliGRAM(s) IV Push every 4 hours PRN Mild Pain (1 - 3)    REVIEW OF SYSTEMS:    Constitutional:            No fever, weight loss or fatigue  HEENT:                      No difficulty hearing, tinnitus, vertigo; No sinus or throat pain  Respiratory:             SOB, Cough  Cardiovascular:          chest pain, palpitations  Gastrointestinal:        No abdominal or epigastric pain. No N/V/diarrhea or hematemesis  SKIN:                             no rash  Musculoskeletal:        No joint pain or swelling  Extremities:                No swelling  Neurological:              No headaches  Psychiatric:                 No depression, anxiety    Vital Signs Last 24 Hrs  T(C): 37.3 (2022 17:06), Max: 38.1 (2022 21:16)  T(F): 99.1 (2022 17:06), Max: 100.5 (2022 21:16)  HR: 120 (2022 17:06) (109 - 139)  BP: 128/77 (2022 17:06) (95/61 - 128/77)  BP(mean): --  RR: 18 (2022 17:06) (18 - 20)  SpO2: 97% (2022 17:06) (97% - 100%)    PHYSICAL EXAM:  GEN:         Awake, responsive and comfortable.  HEENT:    Normal.    RESP:        no wheezing  CVS:          Regular rate and rhythm.   ABD:         Soft, non-tender, non-distended;   SKIN:           Warm and dry.  EXTR:            No clubbing, cyanosis or edema  CNS:              Intact sensory and motor function.  PSYCH:        cooperative, no anxiety or depression    LABS:                        7.3    13.70 )-----------( 510      ( 2022 07:02 )             24.3     04-    134<L>  |  102  |  5<L>  ----------------------------<  87  4.2   |  23  |  0.56    Ca    10.1      2022 07:02    TPro  8.1  /  Alb  2.3<L>  /  TBili  0.2  /  DBili  x   /  AST  19  /  ALT  11<L>  /  AlkPhos  87  04-    PT/INR - ( 2022 16:38 )   PT: 18.0 sec;   INR: 1.49 ratio      PTT - ( 2022 16:38 )  PTT:34.0 sec    Urinalysis Basic - ( 2022 09:44 )    Color: Yellow / Appearance: Clear / S.005 / pH: x  Gluc: x / Ketone: Negative  / Bili: Negative / Urobili: Negative mg/dL   Blood: x / Protein: Negative mg/dL / Nitrite: Negative   Leuk Esterase: Negative / RBC: x / WBC x   Sq Epi: x / Non Sq Epi: x / Bacteria: x    EKG: sinus    RADIOLOGY & ADDITIONAL STUDIES:  < from: CT Angio Chest PE Protocol w/ IV Cont (22 @ 17:35) >  ACC: [id number] EXAM:  CT ANGIO CHEST PULM [facility]                          PROCEDURE DATE:  2022      INTERPRETATION:  INDICATION, CLINICAL INFORMATION: Chest pain, lung cancer  TECHNIQUE: CT pulmonary angiogram of the chest . Uneventful  administration of 90 cc Omnipaque 350. Coronal, sagittal and 3-D/MIP   images were reconstructed/reviewed.  COMPARISON: comparison chest CT.    FINDINGS:    PULMONARY VESSELS: No main, lobar or segmental pulmonary embolus. The   subsegmental branches of the pulmonary arteries are not evaluated   secondary to motion.    HEART AND VASCULATURE: Heart size is normal. No pericardial effusion. No   aortic aneurysm or dissection. Tip of the Mediport catheter at the   cavoatrial junction.    LUNGS, AIRWAYS, PLEURA: The segmental branches of right lower lobe   bronchus are obliterated. The remaining central airways are patent. The   right lower lobe has been replaced by tumor infiltration/consolidation.   There is infiltration of the mass into the right hilum and subcarinal   region. The other lobes are clear. No pleural effusions or pneumothorax.    MEDIASTINUM: Infiltration of the mass into the right hilum and subcarinal   region.    UPPER ABDOMEN: Within normal limits.    BONES AND SOFT TISSUES: No aggressive osseous lesion.    LOWER NECK: Within normal limits.    IMPRESSION:    No main, lobar or segmental pulmonary embolus. The subsegmental branches   of the pulmonary arteries are not evaluated secondary to motion.    The right lower lobe has been replaced by tumor   infiltration/consolidation. There is infiltration of the mass into the   right hilum and subcarinal region of the mediastinum.    COOKIE OLIVAREZ MD; Attending Radiologist  This document has been electronically signed. 2022  5:48PM    ASSESSMENT AND PLAN:  ·	SOB.  ·	Possibility of post obstructive pneumonia.  ·	Lung cancer.  ·	Leukocytosis.  ·	Anemia.   ·	Colon Cancer hx.  ·	HTN    SPo2 97% on room air.  Continue antibiotics.  Pain control.  Seen by palliative care.

## 2022-04-22 NOTE — CONSULT NOTE ADULT - PROBLEM SELECTOR RECOMMENDATION 9
pt on NC upon exam, reports improved SOB.   Remains SOB upon exertion  pt reports she does not use oxygen at home  oxygen prn, titrate off as tolerated pt on NC upon exam, reports improved SOB.   Remains SOB upon exertion  pt reports she does not use oxygen at home  oxygen prn, titrate off as tolerated  pt on IV abx for PNA tx

## 2022-04-23 LAB
ALBUMIN SERPL ELPH-MCNC: 2.3 G/DL — LOW (ref 3.3–5)
ALP SERPL-CCNC: 90 U/L — SIGNIFICANT CHANGE UP (ref 40–120)
ALT FLD-CCNC: 9 U/L — LOW (ref 12–78)
ANION GAP SERPL CALC-SCNC: 10 MMOL/L — SIGNIFICANT CHANGE UP (ref 5–17)
AST SERPL-CCNC: 20 U/L — SIGNIFICANT CHANGE UP (ref 15–37)
BILIRUB SERPL-MCNC: 0.3 MG/DL — SIGNIFICANT CHANGE UP (ref 0.2–1.2)
BUN SERPL-MCNC: 5 MG/DL — LOW (ref 7–23)
CALCIUM SERPL-MCNC: 10.6 MG/DL — HIGH (ref 8.5–10.1)
CHLORIDE SERPL-SCNC: 99 MMOL/L — SIGNIFICANT CHANGE UP (ref 96–108)
CO2 SERPL-SCNC: 25 MMOL/L — SIGNIFICANT CHANGE UP (ref 22–31)
CREAT SERPL-MCNC: 0.58 MG/DL — SIGNIFICANT CHANGE UP (ref 0.5–1.3)
EGFR: 101 ML/MIN/1.73M2 — SIGNIFICANT CHANGE UP
GLUCOSE SERPL-MCNC: 95 MG/DL — SIGNIFICANT CHANGE UP (ref 70–99)
HCT VFR BLD CALC: 25.5 % — LOW (ref 34.5–45)
HGB BLD-MCNC: 7.6 G/DL — LOW (ref 11.5–15.5)
MCHC RBC-ENTMCNC: 23.4 PG — LOW (ref 27–34)
MCHC RBC-ENTMCNC: 29.8 G/DL — LOW (ref 32–36)
MCV RBC AUTO: 78.5 FL — LOW (ref 80–100)
NRBC # BLD: 0 /100 WBCS — SIGNIFICANT CHANGE UP (ref 0–0)
PLATELET # BLD AUTO: 532 K/UL — HIGH (ref 150–400)
POTASSIUM SERPL-MCNC: 4.1 MMOL/L — SIGNIFICANT CHANGE UP (ref 3.5–5.3)
POTASSIUM SERPL-SCNC: 4.1 MMOL/L — SIGNIFICANT CHANGE UP (ref 3.5–5.3)
PROT SERPL-MCNC: 8.3 GM/DL — SIGNIFICANT CHANGE UP (ref 6–8.3)
RBC # BLD: 3.25 M/UL — LOW (ref 3.8–5.2)
RBC # FLD: 17.2 % — HIGH (ref 10.3–14.5)
SODIUM SERPL-SCNC: 134 MMOL/L — LOW (ref 135–145)
WBC # BLD: 14.83 K/UL — HIGH (ref 3.8–10.5)
WBC # FLD AUTO: 14.83 K/UL — HIGH (ref 3.8–10.5)

## 2022-04-23 PROCEDURE — 99233 SBSQ HOSP IP/OBS HIGH 50: CPT

## 2022-04-23 RX ORDER — METOPROLOL TARTRATE 50 MG
1 TABLET ORAL
Qty: 60 | Refills: 0
Start: 2022-04-23 | End: 2022-05-22

## 2022-04-23 RX ORDER — OXYCODONE HYDROCHLORIDE 5 MG/1
1 TABLET ORAL
Qty: 12 | Refills: 0
Start: 2022-04-23 | End: 2022-04-25

## 2022-04-23 RX ORDER — CIPROFLOXACIN LACTATE 400MG/40ML
1 VIAL (ML) INTRAVENOUS
Qty: 5 | Refills: 0
Start: 2022-04-23 | End: 2022-04-27

## 2022-04-23 RX ADMIN — HEPARIN SODIUM 5000 UNIT(S): 5000 INJECTION INTRAVENOUS; SUBCUTANEOUS at 05:27

## 2022-04-23 RX ADMIN — Medication 25 MILLIGRAM(S): at 17:14

## 2022-04-23 RX ADMIN — Medication 650 MILLIGRAM(S): at 16:43

## 2022-04-23 RX ADMIN — MORPHINE SULFATE 2 MILLIGRAM(S): 50 CAPSULE, EXTENDED RELEASE ORAL at 22:15

## 2022-04-23 RX ADMIN — HEPARIN SODIUM 5000 UNIT(S): 5000 INJECTION INTRAVENOUS; SUBCUTANEOUS at 17:14

## 2022-04-23 RX ADMIN — Medication 650 MILLIGRAM(S): at 17:26

## 2022-04-23 RX ADMIN — MORPHINE SULFATE 2 MILLIGRAM(S): 50 CAPSULE, EXTENDED RELEASE ORAL at 00:22

## 2022-04-23 RX ADMIN — Medication 25 MILLIGRAM(S): at 05:27

## 2022-04-23 RX ADMIN — MORPHINE SULFATE 2 MILLIGRAM(S): 50 CAPSULE, EXTENDED RELEASE ORAL at 01:00

## 2022-04-23 RX ADMIN — MORPHINE SULFATE 2 MILLIGRAM(S): 50 CAPSULE, EXTENDED RELEASE ORAL at 21:38

## 2022-04-23 NOTE — DIETITIAN INITIAL EVALUATION ADULT - OTHER INFO
PMH include stage IV lung cancer( not on chemo PTA), Lung cancer diagnosed 2 years ago.  Problem diagnosis include pneumonia, sepsis, anemia, for transfusion, for Palliative evaluation.   Pt educated on  consumption of 6 small meals, cold food items due to altered smell, trial of stewed prunes for c/o constipation.  Pt would like Ensure Plus 2 x day.

## 2022-04-23 NOTE — PHYSICAL THERAPY INITIAL EVALUATION ADULT - GENERAL OBSERVATIONS, REHAB EVAL
Chart (EMR) reviewed. Received sitting at bedside chair, NAD. +cardiac monitor donned, +heplock. Alert. Ox4. Able to follow multistep commands/directions.

## 2022-04-23 NOTE — DIETITIAN INITIAL EVALUATION ADULT - PERTINENT MEDS FT
MEDICATIONS  (STANDING):  heparin   Injectable 5000 Unit(s) SubCutaneous every 12 hours  levoFLOXacin IVPB      levoFLOXacin IVPB 500 milliGRAM(s) IV Intermittent every 24 hours  metoprolol tartrate 25 milliGRAM(s) Oral two times a day    MEDICATIONS  (PRN):  acetaminophen     Tablet .. 650 milliGRAM(s) Oral every 6 hours PRN Temp greater or equal to 38C (100.4F), Mild Pain (1 - 3)  melatonin 3 milliGRAM(s) Oral once PRN Sleep  morphine  - Injectable 2 milliGRAM(s) IV Push every 4 hours PRN Mild Pain (1 - 3)

## 2022-04-23 NOTE — DIETITIAN INITIAL EVALUATION ADULT - ORAL INTAKE PTA/DIET HISTORY
Pt appeared weak, c c/o lightheadedness, nausea, trying to eat small amounts, afraid to eat as she feels that she may vomit.  Pt c c/o constipation, altered smell c smell of hot foods bothering her due to nausea.  Pt's children did the shopping/cooking PTA.

## 2022-04-23 NOTE — DIETITIAN INITIAL EVALUATION ADULT - ETIOLOGY
inadequate protein-energy intake in setting of pneumonia, sepsis, altered GI function, weakness  inadequate protein-energy intake in setting of pneumonia, sepsis, altered GI function, anemia, weakness

## 2022-04-23 NOTE — DIETITIAN INITIAL EVALUATION ADULT - FACTORS AFF FOOD INTAKE
change in sense of smell or taste/persistent constipation/persistent lack of appetite/persistent nausea

## 2022-04-23 NOTE — DISCHARGE NOTE PROVIDER - NSDCMRMEDTOKEN_GEN_ALL_CORE_FT
benzonatate 200 mg oral capsule: 1 cap(s) orally 3 times a day, As Needed -for cough   levoFLOXacin 750 mg oral tablet: 1 tab(s) orally once a day   metoprolol tartrate 25 mg oral tablet: 1 tab(s) orally 2 times a day  Outpatient Physical Therapy :   Oxaydo 5 mg oral tablet: 1 tab(s) orally every 6 hours, As Needed -for moderate pain - for severe pain MDD:4tab

## 2022-04-23 NOTE — DISCHARGE NOTE PROVIDER - HOSPITAL COURSE
63 yo female PMH stage IV Lung CA/Colon Ca (not on chemotherapy)  for past 6 months diagnosed with lung cancer 2 years ago now presents with chest pain and SOB. She went to urgent care where she was transferred to the ED. Admits to chronic cough, no effective remedy tried. Has been vaccinated  for COVID.  CTA shows large lung mass with likely extension.       Dyspnea 2/2   stage IV lung cancer,  pneumonia, w sepsis poa   procal low, will change to po abx to f/u as outpt  pulm/palliative eval appreciated  pt doing well on room air  sinus tachycardia, asymptomatic can follow as outpatient     Anemia  likely anemia of chronic dz  stable for outpt monitoring         pt seen and examined 45 min spent on dc planning     Lab test review, Radiology Review, Vitals review, Consultant review and discussion, Physical examination, IDR, Assessment and plan; Plan discussion with patient and family    61 yo female PMH stage IV Lung CA/Colon Ca (not on chemotherapy)  for past 6 months diagnosed with lung cancer 2 years ago now presents with chest pain and SOB. She went to urgent care where she was transferred to the ED. Admits to chronic cough, no effective remedy tried. Has been vaccinated  for COVID.  CTA shows large lung mass with likely extension.       Dyspnea 2/2   stage IV lung cancer,  pneumonia, w sepsis poa   procal low, will change to po abx to f/u as outpt  pulm/palliative eval appreciated  pt doing well on room air  sinus tachycardia, asymptomatic can follow as outpatient, continue bb     Anemia  likely anemia of chronic dz  symptomatic, transfused 1un w good response  no active signs of bleeding         pt seen and examined 45 min spent on dc planning     Lab test review, Radiology Review, Vitals review, Consultant review and discussion, Physical examination, IDR, Assessment and plan; Plan discussion with patient and family

## 2022-04-23 NOTE — DIETITIAN INITIAL EVALUATION ADULT - PERTINENT LABORATORY DATA
04-23    134<L>  |  99  |  5<L>  ----------------------------<  95  4.1   |  25  |  0.58    Ca    10.6<H>      23 Apr 2022 08:46    TPro  8.3  /  Alb  2.3<L>  /  TBili  0.3  /  DBili  x   /  AST  20  /  ALT  9<L>  /  AlkPhos  90  04-23 04-23 Hgb 7.6 g/dL<L> Hct 25.5 %<L>, MCV, MCH, MCHV <L>

## 2022-04-23 NOTE — PROGRESS NOTE ADULT - ASSESSMENT
61 yo female PMH stage IV Lung CA/Colon Ca (not on chemotherapy)  for past 6 months diagnosed with lung cancer 2 years ago now presents with chest pain and SOB. She went to urgent care where she was transferred to the ED. Admits to chronic cough, no effective remedy tried. Has been vaccinated  for COVID.  CTA shows large lung mass with likely extension.       Dyspnea 2/2   stage IV lung cancer,  pneumonia, w sepsis poa   c.w iv abx    pulm/palliative eval     Anemia  likely anemia of chronic dz  will monitor cbc 
61 yo female PMH stage IV Lung CA/Colon Ca (not on chemotherapy)  for past 6 months diagnosed with lung cancer 2 years ago now presents with chest pain and SOB. She went to urgent care where she was transferred to the ED. Admits to chronic cough, no effective remedy tried. Has been vaccinated  for COVID.  CTA shows large lung mass with likely extension.       Pt sinus tachy, got lightheaded and nauseous w pt today, weak, hgb is 7.6 , likely symptomatic anemia  will transfuse 1un today     Dyspnea 2/2   stage IV lung cancer,  pneumonia, w sepsis poa , procal low   c.w iv abx    pulm/palliative eval     Anemia  likely anemia of chronic dz  will monitor cbc

## 2022-04-23 NOTE — DISCHARGE NOTE PROVIDER - NSDCCPCAREPLAN_GEN_ALL_CORE_FT
PRINCIPAL DISCHARGE DIAGNOSIS  Diagnosis: Chest pain  Assessment and Plan of Treatment: finish antibiotics for your pneumonia  follow up w your cancer doctor to continue treatment for lung cancer

## 2022-04-23 NOTE — PROGRESS NOTE ADULT - SUBJECTIVE AND OBJECTIVE BOX
INTERVAL HPI:  61 yo female with HTN, Colon cancer S/P Surgery+ Colostomy followed by Chemo+ Radiation,  stage IV Lung CA diagnosed about two years ago  (not on chemotherapy)  for past 6 months . Non smoker.  Presented  with right sided chest pain, SOB, productive cough, Chills and subjective fever for few days.   Seen at  urgent care where she was transferred to the ED. Admits to chronic cough, no effective remedy tried.   Has been vaccinated  for COVID.  CTA shows large lung mass with likely extension. Has mediport in place.  (2022 19:57)    OVERNIGHT EVENTS:  Feels better.    Vital Signs Last 24 Hrs  T(C): 37.4 (2022 17:20), Max: 37.8 (2022 15:42)  T(F): 99.3 (2022 17:20), Max: 100 (2022 15:42)  HR: 116 (2022 17:20) (116 - 123)  BP: 121/80 (2022 17:20) (108/61 - 128/72)  BP(mean): --  RR: 18 (2022 17:20) (18 - 18)  SpO2: 97% (2022 17:20) (96% - 98%)    PHYSICAL EXAM:  GEN:         Awake, responsive and comfortable.  HEENT:    Normal.    RESP:       no wheezing.  CVS:         Regular rate and rhythm.     MEDICATIONS  (STANDING):  heparin   Injectable 5000 Unit(s) SubCutaneous every 12 hours  levoFLOXacin IVPB      levoFLOXacin IVPB 500 milliGRAM(s) IV Intermittent every 24 hours  metoprolol tartrate 25 milliGRAM(s) Oral two times a day    MEDICATIONS  (PRN):  acetaminophen     Tablet .. 650 milliGRAM(s) Oral every 6 hours PRN Temp greater or equal to 38C (100.4F), Mild Pain (1 - 3)  melatonin 3 milliGRAM(s) Oral once PRN Sleep  morphine  - Injectable 2 milliGRAM(s) IV Push every 4 hours PRN Mild Pain (1 - 3)    LABS:                        7.6    14.83 )-----------( 532      ( 2022 08:46 )             25.5     04-23    134<L>  |  99  |  5<L>  ----------------------------<  95  4.1   |  25  |  0.58    Ca    10.6<H>      2022 08:46    TPro  8.3  /  Alb  2.3<L>  /  TBili  0.3  /  DBili  x   /  AST  20  /  ALT  9<L>  /  AlkPhos  90      Urinalysis Basic - ( 2022 09:44 )    Color: Yellow / Appearance: Clear / S.005 / pH: x  Gluc: x / Ketone: Negative  / Bili: Negative / Urobili: Negative mg/dL   Blood: x / Protein: Negative mg/dL / Nitrite: Negative   Leuk Esterase: Negative / RBC: x / WBC x   Sq Epi: x / Non Sq Epi: x / Bacteria: x    ASSESSMENT AND PLAN:  ·	SOB.  ·	Possibility of post obstructive pneumonia.  ·	Lung cancer.  ·	Leukocytosis.  ·	Anemia.   ·	Colon Cancer hx.  ·	HTN    SPO2 96% on room air.  Getting PRBC transfusion.  Continue antibiotics.  Pain control.
Patient is a 62y old  Female who presents with a chief complaint of Right sided rib pain. (2022 19:57)    INTERVAL HPI/OVERNIGHT EVENTS: no events     MEDICATIONS  (STANDING):  heparin   Injectable 5000 Unit(s) SubCutaneous every 12 hours  levoFLOXacin IVPB      levoFLOXacin IVPB 500 milliGRAM(s) IV Intermittent every 24 hours  metoprolol tartrate 25 milliGRAM(s) Oral two times a day    MEDICATIONS  (PRN):  acetaminophen     Tablet .. 650 milliGRAM(s) Oral every 6 hours PRN Temp greater or equal to 38C (100.4F), Mild Pain (1 - 3)  melatonin 3 milliGRAM(s) Oral once PRN Sleep  morphine  - Injectable 2 milliGRAM(s) IV Push every 4 hours PRN Mild Pain (1 - 3)    Allergies    penicillin (Rash)    Intolerances      REVIEW OF SYSTEMS:  All other systems reviewed and are negative    Vital Signs Last 24 Hrs  T(C): 36.8 (2022 10:24), Max: 38.1 (2022 21:16)  T(F): 98.2 (2022 10:24), Max: 100.5 (2022 21:16)  HR: 117 (2022 10:24) (109 - 139)  BP: 102/67 (2022 10:24) (95/61 - 114/72)  BP(mean): --  RR: 19 (2022 10:24) (18 - 20)  SpO2: 100% (2022 10:24) (97% - 100%)  Daily Height in cm: 162.56 (2022 14:08)    Daily Weight in k.1 (2022 21:16)  I&O's Summary    2022 07:01  -  2022 07:00  --------------------------------------------------------  IN: 1250 mL / OUT: 0 mL / NET: 1250 mL    2022 07:01  -  2022 12:44  --------------------------------------------------------  IN: 120 mL / OUT: 0 mL / NET: 120 mL      CAPILLARY BLOOD GLUCOSE        PHYSICAL EXAM:  GENERAL: NAD,    HEAD:  Atraumatic, Normocephalic  EYES: EOMI, PERRLA, conjunctiva and sclera clear  ENMT: No tonsillar erythema, exudates, or enlargement; Moist mucous membranes, Good dentition, No lesions  NECK: Supple, No JVD, Normal thyroid  NERVOUS SYSTEM:  Alert & Oriented X3, Good concentration; Motor Strength 5/5 B/L upper and lower extremities; DTRs 2+ intact and symmetric  CHEST/LUNG: Clear to percussion bilaterally; No rales, rhonchi, wheezing, or rubs  HEART: Regular rate and rhythm; No murmurs, rubs, or gallops  ABDOMEN: Soft, Nontender, Nondistended; Bowel sounds present  EXTREMITIES:  2+ Peripheral Pulses, No clubbing, cyanosis, or edema  LYMPH: No lymphadenopathy noted  SKIN: No rashes or lesions    Labs                          7.3    13.70 )-----------( 510      ( 2022 07:02 )             24.3     04-22    134<L>  |  102  |  5<L>  ----------------------------<  87  4.2   |  23  |  0.56    Ca    10.1      2022 07:02    TPro  8.1  /  Alb  2.3<L>  /  TBili  0.2  /  DBili  x   /  AST  19  /  ALT  11<L>  /  AlkPhos  87  04-21    PT/INR - ( 2022 16:38 )   PT: 18.0 sec;   INR: 1.49 ratio         PTT - ( 2022 16:38 )  PTT:34.0 sec      Urinalysis Basic - ( 2022 09:44 )    Color: Yellow / Appearance: Clear / S.005 / pH: x  Gluc: x / Ketone: Negative  / Bili: Negative / Urobili: Negative mg/dL   Blood: x / Protein: Negative mg/dL / Nitrite: Negative   Leuk Esterase: Negative / RBC: x / WBC x   Sq Epi: x / Non Sq Epi: x / Bacteria: x                  DVT prophylaxis: > Lovenox 40mg SQ daily  > Heparin   > SCD's
Patient is a 62y old  Female who presents with a chief complaint of Right sided rib pain. (2022 10:43)    INTERVAL HPI/OVERNIGHT EVENTS:    MEDICATIONS  (STANDING):  heparin   Injectable 5000 Unit(s) SubCutaneous every 12 hours  levoFLOXacin IVPB      levoFLOXacin IVPB 500 milliGRAM(s) IV Intermittent every 24 hours  metoprolol tartrate 25 milliGRAM(s) Oral two times a day    MEDICATIONS  (PRN):  acetaminophen     Tablet .. 650 milliGRAM(s) Oral every 6 hours PRN Temp greater or equal to 38C (100.4F), Mild Pain (1 - 3)  melatonin 3 milliGRAM(s) Oral once PRN Sleep  morphine  - Injectable 2 milliGRAM(s) IV Push every 4 hours PRN Mild Pain (1 - 3)    Allergies    penicillin (Rash)    Intolerances      REVIEW OF SYSTEMS:  All other systems reviewed and are negative    Vital Signs Last 24 Hrs  T(C): 37.1 (2022 10:48), Max: 37.3 (2022 17:06)  T(F): 98.8 (2022 10:48), Max: 99.1 (2022 17:06)  HR: 118 (2022 10:48) (117 - 123)  BP: 109/72 (2022 10:48) (106/75 - 128/77)  BP(mean): --  RR: 18 (2022 10:48) (18 - 20)  SpO2: 97% (2022 10:48) (96% - 98%)  Daily     Daily   I&O's Summary    2022 07:  -  2022 07:00  --------------------------------------------------------  IN: 360 mL / OUT: 0 mL / NET: 360 mL    2022 07:01  -  2022 11:29  --------------------------------------------------------  IN: 240 mL / OUT: 0 mL / NET: 240 mL      CAPILLARY BLOOD GLUCOSE        PHYSICAL EXAM:  GENERAL: NAD,    HEAD:  Atraumatic, Normocephalic  EYES: EOMI, PERRLA, conjunctiva and sclera clear  ENMT: No tonsillar erythema, exudates, or enlargement; Moist mucous membranes, Good dentition, No lesions  NECK: Supple, No JVD, Normal thyroid  NERVOUS SYSTEM:  Alert & Oriented X3, Good concentration; Motor Strength 5/5 B/L upper and lower extremities; DTRs 2+ intact and symmetric  CHEST/LUNG: Clear to percussion bilaterally; No rales, rhonchi, wheezing, or rubs  HEART: Regular rate and rhythm; No murmurs, rubs, or gallops  ABDOMEN: Soft, Nontender, Nondistended; Bowel sounds present  EXTREMITIES:  2+ Peripheral Pulses, No clubbing, cyanosis, or edema  LYMPH: No lymphadenopathy noted  SKIN: No rashes or lesions    Labs                          7.6    14.83 )-----------( 532      ( 2022 08:46 )             25.5     04-23    134<L>  |  99  |  5<L>  ----------------------------<  95  4.1   |  25  |  0.58    Ca    10.6<H>      2022 08:46    TPro  8.3  /  Alb  2.3<L>  /  TBili  0.3  /  DBili  x   /  AST  20  /  ALT  9<L>  /  AlkPhos  90  04-23    PT/INR - ( 2022 16:38 )   PT: 18.0 sec;   INR: 1.49 ratio         PTT - ( 2022 16:38 )  PTT:34.0 sec      Urinalysis Basic - ( 2022 09:44 )    Color: Yellow / Appearance: Clear / S.005 / pH: x  Gluc: x / Ketone: Negative  / Bili: Negative / Urobili: Negative mg/dL   Blood: x / Protein: Negative mg/dL / Nitrite: Negative   Leuk Esterase: Negative / RBC: x / WBC x   Sq Epi: x / Non Sq Epi: x / Bacteria: x        Culture - Blood (collected 2022 01:00)  Source: .Blood Blood-Peripheral  Preliminary Report (2022 02:01):    No growth to date.    Culture - Blood (collected 2022 01:00)  Source: .Blood Blood-Peripheral  Preliminary Report (2022 02:01):    No growth to date.                DVT prophylaxis: > Lovenox 40mg SQ daily  > Heparin   > SCD's

## 2022-04-23 NOTE — PHYSICAL THERAPY INITIAL EVALUATION ADULT - ADDITIONAL COMMENTS
Patient lives c children in a pvt house c 1 threshold entry step, 1 flight of stairs c L rail up inside.  Independent c all ADL's and ambulation without device. Pt has own straight cane.

## 2022-04-23 NOTE — DISCHARGE NOTE PROVIDER - DETAILS OF MALNUTRITION DIAGNOSIS/DIAGNOSES
This patient has been assessed with a concern for Malnutrition and was treated during this hospitalization for the following Nutrition diagnosis/diagnoses:     -  04/23/2022: Severe protein-calorie malnutrition

## 2022-04-24 VITALS
OXYGEN SATURATION: 97 % | SYSTOLIC BLOOD PRESSURE: 125 MMHG | DIASTOLIC BLOOD PRESSURE: 78 MMHG | RESPIRATION RATE: 18 BRPM | HEART RATE: 109 BPM | TEMPERATURE: 98 F

## 2022-04-24 LAB
CULTURE RESULTS: NO GROWTH — SIGNIFICANT CHANGE UP
HCT VFR BLD CALC: 29.1 % — LOW (ref 34.5–45)
HGB BLD-MCNC: 8.9 G/DL — LOW (ref 11.5–15.5)
MCHC RBC-ENTMCNC: 23.9 PG — LOW (ref 27–34)
MCHC RBC-ENTMCNC: 30.6 G/DL — LOW (ref 32–36)
MCV RBC AUTO: 78.2 FL — LOW (ref 80–100)
NRBC # BLD: 0 /100 WBCS — SIGNIFICANT CHANGE UP (ref 0–0)
PLATELET # BLD AUTO: 502 K/UL — HIGH (ref 150–400)
RBC # BLD: 3.72 M/UL — LOW (ref 3.8–5.2)
RBC # FLD: 16.5 % — HIGH (ref 10.3–14.5)
SPECIMEN SOURCE: SIGNIFICANT CHANGE UP
WBC # BLD: 13.5 K/UL — HIGH (ref 3.8–10.5)
WBC # FLD AUTO: 13.5 K/UL — HIGH (ref 3.8–10.5)

## 2022-04-24 PROCEDURE — 99239 HOSP IP/OBS DSCHRG MGMT >30: CPT

## 2022-04-24 RX ADMIN — Medication 25 MILLIGRAM(S): at 05:34

## 2022-04-24 RX ADMIN — HEPARIN SODIUM 5000 UNIT(S): 5000 INJECTION INTRAVENOUS; SUBCUTANEOUS at 05:34

## 2022-04-24 NOTE — DISCHARGE NOTE NURSING/CASE MANAGEMENT/SOCIAL WORK - PATIENT PORTAL LINK FT
You can access the FollowMyHealth Patient Portal offered by [facility] by registering at the following website: [email or web address]. By joining [employer]’s FollowMyHealth portal, you will also be able to view your health information using other applications (apps) compatible with our system.

## 2022-04-24 NOTE — DISCHARGE NOTE NURSING/CASE MANAGEMENT/SOCIAL WORK - NSDCPEFALRISK_GEN_ALL_CORE
For information on Fall & Injury Prevention, visit: [email or web address] OR  [email or web address] OR  https://www.cdc.gov/steadi/patient.html

## 2022-04-27 LAB
CULTURE RESULTS: SIGNIFICANT CHANGE UP
CULTURE RESULTS: SIGNIFICANT CHANGE UP
SPECIMEN SOURCE: SIGNIFICANT CHANGE UP
SPECIMEN SOURCE: SIGNIFICANT CHANGE UP

## 2022-05-03 DIAGNOSIS — I10 ESSENTIAL (PRIMARY) HYPERTENSION: ICD-10-CM

## 2022-05-03 DIAGNOSIS — A41.9 SEPSIS, UNSPECIFIED ORGANISM: ICD-10-CM

## 2022-05-03 DIAGNOSIS — D72.829 ELEVATED WHITE BLOOD CELL COUNT, UNSPECIFIED: ICD-10-CM

## 2022-05-03 DIAGNOSIS — C18.9 MALIGNANT NEOPLASM OF COLON, UNSPECIFIED: ICD-10-CM

## 2022-05-03 DIAGNOSIS — Z92.3 PERSONAL HISTORY OF IRRADIATION: ICD-10-CM

## 2022-05-03 DIAGNOSIS — R00.0 TACHYCARDIA, UNSPECIFIED: ICD-10-CM

## 2022-05-03 DIAGNOSIS — C34.90 MALIGNANT NEOPLASM OF UNSPECIFIED PART OF UNSPECIFIED BRONCHUS OR LUNG: ICD-10-CM

## 2022-05-03 DIAGNOSIS — Z88.0 ALLERGY STATUS TO PENICILLIN: ICD-10-CM

## 2022-05-03 DIAGNOSIS — J18.9 PNEUMONIA, UNSPECIFIED ORGANISM: ICD-10-CM

## 2022-05-03 DIAGNOSIS — Z51.5 ENCOUNTER FOR PALLIATIVE CARE: ICD-10-CM

## 2022-05-03 DIAGNOSIS — Z92.21 PERSONAL HISTORY OF ANTINEOPLASTIC CHEMOTHERAPY: ICD-10-CM

## 2022-05-03 DIAGNOSIS — E43 UNSPECIFIED SEVERE PROTEIN-CALORIE MALNUTRITION: ICD-10-CM

## 2022-05-03 DIAGNOSIS — R07.9 CHEST PAIN, UNSPECIFIED: ICD-10-CM

## 2022-05-03 DIAGNOSIS — D63.8 ANEMIA IN OTHER CHRONIC DISEASES CLASSIFIED ELSEWHERE: ICD-10-CM
